# Patient Record
Sex: MALE | Race: WHITE | Employment: UNEMPLOYED | URBAN - METROPOLITAN AREA
[De-identification: names, ages, dates, MRNs, and addresses within clinical notes are randomized per-mention and may not be internally consistent; named-entity substitution may affect disease eponyms.]

---

## 2023-08-20 ENCOUNTER — HOSPITAL ENCOUNTER (EMERGENCY)
Facility: HOSPITAL | Age: 43
Discharge: HOME | End: 2023-08-20
Attending: EMERGENCY MEDICINE
Payer: MEDICAID

## 2023-08-20 VITALS
TEMPERATURE: 98.2 F | RESPIRATION RATE: 18 BRPM | SYSTOLIC BLOOD PRESSURE: 113 MMHG | OXYGEN SATURATION: 97 % | DIASTOLIC BLOOD PRESSURE: 71 MMHG | HEART RATE: 87 BPM

## 2023-08-20 DIAGNOSIS — Z59.00 HOMELESS: Primary | ICD-10-CM

## 2023-08-20 PROCEDURE — 99283 EMERGENCY DEPT VISIT LOW MDM: CPT

## 2023-08-20 SDOH — ECONOMIC STABILITY - HOUSING INSECURITY: HOMELESSNESS UNSPECIFIED: Z59.00

## 2023-08-20 ASSESSMENT — PAIN - FUNCTIONAL ASSESSMENT: PAIN_FUNCTIONAL_ASSESSMENT: NONE - DENIES PAIN

## 2023-08-20 ASSESSMENT — ENCOUNTER SYMPTOMS
ABDOMINAL PAIN: 0
CHEST TIGHTNESS: 0

## 2023-08-20 NOTE — ED PROVIDER NOTES
[address] EMERGENCY [facility]      Pt Name: Patricia Nation  MRN: [id number]  [address] 1980  Date of evaluation: 8/20/2023  Provider: Demetrio Browne       Chief Complaint   Patient presents with    Other         HISTORY OF PRESENT ILLNESS   (Location/Symptom, Timing/Onset, Context/Setting, Quality, Duration, Modifying Factors, Severity)  Note limiting factors. 82-year-old male presents emergency department chief complaint of needing housing. He states he was not feeling well was seen by [facility] worked up for dehydration. He then came to our facility requesting housing. Denies chest pain shortness of breath or any other complaints. He states he was evicted from his apartment housing. He would not elaborate on further information    The history is provided by the patient. Review of External Medical Records:     Nursing Notes were reviewed. REVIEW OF SYSTEMS    (2-9 systems for level 4, 10 or more for level 5)     Review of Systems   Constitutional:  Positive for fatigue. Negative for activity change. HENT:  Negative for sneezing. Respiratory:  Negative for chest tightness. Gastrointestinal:  Negative for abdominal pain. Genitourinary:  Negative for difficulty urinating. Neurological:  Negative for weakness. Psychiatric/Behavioral:  Positive for sleep disturbance. Negative for suicidal ideas. Except as noted above the remainder of the review of systems was reviewed and negative. PAST MEDICAL HISTORY   No past medical history on file. SURGICAL HISTORY     No past surgical history on file. CURRENT MEDICATIONS       Previous Medications    No medications on file       ALLERGIES     Patient has no known allergies. FAMILY HISTORY     No family history on file.        SOCIAL HISTORY       Social History     Socioeconomic History    Marital status: Single           PHYSICAL EXAM    (up to 7 for level 4, 8 or more for

## 2023-08-20 NOTE — ED NOTES
Patient given discharge instructions by DO Jemima Steen in [facility].        Coty Plummer RN  08/20/23 3523

## 2023-08-20 NOTE — ED TRIAGE NOTES
Pt presents to ED with complaint of not feeling well. Pt was recently evicted and was seen [facility] for dehydration. Pt was given a work up and discharged. Pt presents today hoping to be admitted until he can find housing. Fredo Elmore(Attending)

## 2023-08-20 NOTE — ED NOTES
Patient provided with list of resources from [facility] counselor and directions to next location.        Franny Wen RN  08/20/23 0181